# Patient Record
Sex: MALE | Race: WHITE | NOT HISPANIC OR LATINO | Employment: PART TIME | URBAN - METROPOLITAN AREA
[De-identification: names, ages, dates, MRNs, and addresses within clinical notes are randomized per-mention and may not be internally consistent; named-entity substitution may affect disease eponyms.]

---

## 2017-09-15 ENCOUNTER — APPOINTMENT (OUTPATIENT)
Dept: RADIOLOGY | Facility: MEDICAL CENTER | Age: 37
End: 2017-09-15
Attending: EMERGENCY MEDICINE
Payer: COMMERCIAL

## 2017-09-15 ENCOUNTER — HOSPITAL ENCOUNTER (EMERGENCY)
Facility: MEDICAL CENTER | Age: 37
End: 2017-09-15
Attending: EMERGENCY MEDICINE
Payer: COMMERCIAL

## 2017-09-15 VITALS
RESPIRATION RATE: 16 BRPM | HEART RATE: 60 BPM | TEMPERATURE: 98.6 F | SYSTOLIC BLOOD PRESSURE: 117 MMHG | HEIGHT: 73 IN | DIASTOLIC BLOOD PRESSURE: 85 MMHG | WEIGHT: 178.57 LBS | OXYGEN SATURATION: 97 % | BODY MASS INDEX: 23.67 KG/M2

## 2017-09-15 DIAGNOSIS — R07.2 PRECORDIAL PAIN: ICD-10-CM

## 2017-09-15 PROBLEM — R07.89 DISCOMFORT OF CHEST WALL: Status: ACTIVE | Noted: 2017-09-15

## 2017-09-15 PROBLEM — I35.1 NONRHEUMATIC AORTIC VALVE INSUFFICIENCY: Status: ACTIVE | Noted: 2017-09-15

## 2017-09-15 PROBLEM — I77.810 AORTIC ROOT DILATION (HCC): Status: ACTIVE | Noted: 2017-09-15

## 2017-09-15 LAB
ALBUMIN SERPL BCP-MCNC: 4.8 G/DL (ref 3.2–4.9)
ALBUMIN/GLOB SERPL: 1.8 G/DL
ALP SERPL-CCNC: 66 U/L (ref 30–99)
ALT SERPL-CCNC: 30 U/L (ref 2–50)
ANION GAP SERPL CALC-SCNC: 7 MMOL/L (ref 0–11.9)
AST SERPL-CCNC: 29 U/L (ref 12–45)
BASOPHILS # BLD AUTO: 0.7 % (ref 0–1.8)
BASOPHILS # BLD: 0.04 K/UL (ref 0–0.12)
BILIRUB SERPL-MCNC: 0.7 MG/DL (ref 0.1–1.5)
BUN SERPL-MCNC: 18 MG/DL (ref 8–22)
CALCIUM SERPL-MCNC: 9.6 MG/DL (ref 8.4–10.2)
CHLORIDE SERPL-SCNC: 104 MMOL/L (ref 96–112)
CO2 SERPL-SCNC: 27 MMOL/L (ref 20–33)
CREAT SERPL-MCNC: 0.87 MG/DL (ref 0.5–1.4)
DEPRECATED D DIMER PPP IA-ACNC: <200 NG/ML(D-DU)
EKG IMPRESSION: NORMAL
EOSINOPHIL # BLD AUTO: 0.08 K/UL (ref 0–0.51)
EOSINOPHIL NFR BLD: 1.4 % (ref 0–6.9)
ERYTHROCYTE [DISTWIDTH] IN BLOOD BY AUTOMATED COUNT: 42.7 FL (ref 35.9–50)
GFR SERPL CREATININE-BSD FRML MDRD: >60 ML/MIN/1.73 M 2
GLOBULIN SER CALC-MCNC: 2.7 G/DL (ref 1.9–3.5)
GLUCOSE SERPL-MCNC: 102 MG/DL (ref 65–99)
HCT VFR BLD AUTO: 44.4 % (ref 42–52)
HGB BLD-MCNC: 15.1 G/DL (ref 14–18)
IMM GRANULOCYTES # BLD AUTO: 0.02 K/UL (ref 0–0.11)
IMM GRANULOCYTES NFR BLD AUTO: 0.3 % (ref 0–0.9)
LV EJECT FRACT  99904: 55
LV EJECT FRACT MOD 2C 99903: 50.91
LV EJECT FRACT MOD 4C 99902: 62.34
LV EJECT FRACT MOD BP 99901: 56.9
LYMPHOCYTES # BLD AUTO: 2.31 K/UL (ref 1–4.8)
LYMPHOCYTES NFR BLD: 39.7 % (ref 22–41)
MCH RBC QN AUTO: 30.6 PG (ref 27–33)
MCHC RBC AUTO-ENTMCNC: 34 G/DL (ref 33.7–35.3)
MCV RBC AUTO: 89.9 FL (ref 81.4–97.8)
MONOCYTES # BLD AUTO: 0.6 K/UL (ref 0–0.85)
MONOCYTES NFR BLD AUTO: 10.3 % (ref 0–13.4)
NEUTROPHILS # BLD AUTO: 2.77 K/UL (ref 1.82–7.42)
NEUTROPHILS NFR BLD: 47.6 % (ref 44–72)
NRBC # BLD AUTO: 0 K/UL
NRBC BLD AUTO-RTO: 0 /100 WBC
PLATELET # BLD AUTO: 254 K/UL (ref 164–446)
PMV BLD AUTO: 9.6 FL (ref 9–12.9)
POTASSIUM SERPL-SCNC: 3.9 MMOL/L (ref 3.6–5.5)
PROT SERPL-MCNC: 7.5 G/DL (ref 6–8.2)
RBC # BLD AUTO: 4.94 M/UL (ref 4.7–6.1)
SODIUM SERPL-SCNC: 138 MMOL/L (ref 135–145)
TROPONIN I SERPL-MCNC: <0.02 NG/ML (ref 0–0.04)
TROPONIN I SERPL-MCNC: <0.02 NG/ML (ref 0–0.04)
WBC # BLD AUTO: 5.8 K/UL (ref 4.8–10.8)

## 2017-09-15 PROCEDURE — 93005 ELECTROCARDIOGRAM TRACING: CPT | Performed by: EMERGENCY MEDICINE

## 2017-09-15 PROCEDURE — 84484 ASSAY OF TROPONIN QUANT: CPT

## 2017-09-15 PROCEDURE — 99284 EMERGENCY DEPT VISIT MOD MDM: CPT

## 2017-09-15 PROCEDURE — 36415 COLL VENOUS BLD VENIPUNCTURE: CPT

## 2017-09-15 PROCEDURE — 93306 TTE W/DOPPLER COMPLETE: CPT | Mod: 26 | Performed by: INTERNAL MEDICINE

## 2017-09-15 PROCEDURE — 80053 COMPREHEN METABOLIC PANEL: CPT

## 2017-09-15 PROCEDURE — 700117 HCHG RX CONTRAST REV CODE 255: Performed by: EMERGENCY MEDICINE

## 2017-09-15 PROCEDURE — 85379 FIBRIN DEGRADATION QUANT: CPT

## 2017-09-15 PROCEDURE — 93017 CV STRESS TEST TRACING ONLY: CPT | Performed by: EMERGENCY MEDICINE

## 2017-09-15 PROCEDURE — 85025 COMPLETE CBC W/AUTO DIFF WBC: CPT

## 2017-09-15 PROCEDURE — 71275 CT ANGIOGRAPHY CHEST: CPT

## 2017-09-15 PROCEDURE — 71010 DX-CHEST-PORTABLE (1 VIEW): CPT

## 2017-09-15 PROCEDURE — 93306 TTE W/DOPPLER COMPLETE: CPT

## 2017-09-15 PROCEDURE — 93018 CV STRESS TEST I&R ONLY: CPT | Performed by: INTERNAL MEDICINE

## 2017-09-15 RX ADMIN — IOHEXOL 100 ML: 350 INJECTION, SOLUTION INTRAVENOUS at 11:55

## 2017-09-15 NOTE — ED NOTES
"Pt here alone with c/o    Chief Complaint   Patient presents with   • Chest Pain     intermittant chest pain for 1 month- lasts about 5 min; left sided radiates left shoulder.       /101   Pulse 85   Temp 36.8 °C (98.2 °F)   Resp 16   Ht 1.854 m (6' 1\")   Wt 81 kg (178 lb 9.2 oz)   SpO2 100%   BMI 23.56 kg/m²   "

## 2017-09-15 NOTE — ED NOTES
Pt given written and oral discharge instructions. Pt verbalized understanding of all instructions. Pt ambulating independently. No s/s of distress. VSS. Pt instructed to return if symptoms worsen.

## 2017-09-15 NOTE — DISCHARGE INSTRUCTIONS
Chest Pain, Nonspecific  It is often hard to give a specific diagnosis for the cause of chest pain. There is always a chance that your pain could be related to something serious, like a heart attack or a blood clot in the lungs. You need to follow up with your caregiver for further evaluation. More lab tests or other studies such as X-rays, electrocardiography, stress testing, or cardiac imaging may be needed to find the cause of your pain.  Most of the time, nonspecific chest pain improves within 2 to 3 days with rest and mild pain medicine. For the next few days, avoid physical exertion or activities that bring on pain. Do not smoke. Avoid drinking alcohol. Call your caregiver for routine follow-up as advised.   SEEK IMMEDIATE MEDICAL CARE IF:  · You develop increased chest pain or pain that radiates to the arm, neck, jaw, back, or abdomen.   · You develop shortness of breath, increased coughing, or you start coughing up blood.   · You have severe back or abdominal pain, nausea, or vomiting.   · You develop severe weakness, fainting, fever, or chills.   Document Released: 12/18/2006 Document Revised: 03/11/2013 Document Reviewed: 06/06/2008  [employer]® Patient Information ©2013 [employer].

## 2017-09-15 NOTE — CONSULTS
DATE OF SERVICE:  09/15/2017    HISTORY OF PRESENT ILLNESS:  The patient is a 37-year-old gentleman, being   seen at the request of Dr. Andrews Fontanez, because of a history of chest   discomfort.  This is never exertional and it is an intermittent positional chest   discomfort in the left lateral chest, sometimes infrascapular.  He has no   associated dyspnea and has extraordinarily outstanding exercise tolerance.  He   has not had palpitations, syncope, or near syncope.    He presented to the emergency department for evaluation, and his EKGs   demonstrated high QRS voltage compatible with his tall slender build.  An   echocardiogram was done, which demonstrated mild aortic regurgitation and mild   aortic root dilation.  Because of that, a CT scan of the chest was done,   which showed no evidence of dissection and no dilation above the root.  He   does have mild aortic regurgitation.  Stress test was done, which demonstrate   outstanding exercise tolerance and was negative for myocardial ischemia or   arrhythmia.    PAST MEDICAL HISTORY:  Remarkable only for shingles.  He has had no surgeries   and no chronic medical illnesses.    MEDICATIONS:  No regular medications.    ALLERGIES:  He has no known drug allergies.    SOCIAL HISTORY:  He does not smoke.    FAMILY HISTORY:  Remarkable for heart disease in a maternal great uncle, but   otherwise negative.  His parents are living and well.    REVIEW OF SYSTEMS:  Unremarkable.    PHYSICAL EXAMINATION:  CARDIOVASCULAR:  Exam is normal.  VITAL SIGNS:  Blood pressure 130/70, heart rate 60 and regular, respirations   16, and temperature 98 degrees orally.  HEENT:  Conjunctivae clear.  NECK:  JVP less than 5.  Normal carotid pulse amplitude and contour.  Thyroid   not palpable.  CHEST:  Clear to P/A and nontender.  HEART:  Normal first and second sound with no gallop or murmur.  ABDOMEN:  Soft and nontender without palpable organs, masses, or abnormal   pulses.  Bowel  sounds are normal and no bruits are heard.  EXTREMITIES:  Demonstrate intact pulses with femoral pulse preceding radial   pulse and no cyanosis, clubbing or edema.  NEUROLOGIC:  Shows normal mood, affect, orientation and no abnormalities.    LABORATORY DATA:  His blood pressure was elevated when he first came in, but   normalized promptly over the course of his hospital stay and was 120/70 at the   time that I saw him.    IMPRESSION:  1.  Chest discomfort of uncertain etiology with negative treadmill for   myocardial ischemia and negative CT scan for evidence of aortic disease except   for mild aortic root dilation.  2.  Mild hypertension, on admission.  3.  Mild aortic regurgitation.    PLAN:  He will be seen in cardiology followup.  Immediate reevaluation for any   change in quality or duration of the discomfort or any other associated   cardiovascular symptoms.  I saw him in the emergency department today, so that   I could arrange followup with him as an outpatient especially for blood pressure.    Thank you for the opportunity to see him and to participate in his care.       ____________________________________     MD AYESHA Heard / LANIE    DD:  09/15/2017 13:10:08  DT:  09/15/2017 13:51:34    D#:  [id number]  Job#:  [id number]    cc: LA NENA GONZALEZ MD

## 2017-09-15 NOTE — ED NOTES
Updated pt on POC. Pt in agreement. Pt verbalized understanding of remaining NPO until further notice.

## 2017-09-15 NOTE — PROGRESS NOTES
Regular TM test completed, able to achieve 100% of THR, maximum , tolerated well, no CP. Total exercise time 11 minutes, 12.8 mets.
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
## 2017-10-08 ENCOUNTER — HOSPITAL ENCOUNTER (EMERGENCY)
Facility: MEDICAL CENTER | Age: 37
End: 2017-10-08
Attending: EMERGENCY MEDICINE
Payer: COMMERCIAL

## 2017-10-08 VITALS
WEIGHT: 179.01 LBS | TEMPERATURE: 97.9 F | RESPIRATION RATE: 16 BRPM | BODY MASS INDEX: 23.62 KG/M2 | SYSTOLIC BLOOD PRESSURE: 120 MMHG | DIASTOLIC BLOOD PRESSURE: 83 MMHG | OXYGEN SATURATION: 99 % | HEART RATE: 52 BPM

## 2017-10-08 DIAGNOSIS — Z77.21 EXPOSURE TO BLOOD OR BODY FLUID: ICD-10-CM

## 2017-10-08 LAB — GFR SERPL CREATININE-BSD FRML MDRD: >60 ML/MIN/1.73 M 2

## 2017-10-08 PROCEDURE — 86706 HEP B SURFACE ANTIBODY: CPT

## 2017-10-08 PROCEDURE — 86803 HEPATITIS C AB TEST: CPT

## 2017-10-08 PROCEDURE — 87389 HIV-1 AG W/HIV-1&-2 AB AG IA: CPT

## 2017-10-08 PROCEDURE — 86704 HEP B CORE ANTIBODY TOTAL: CPT

## 2017-10-08 PROCEDURE — 36415 COLL VENOUS BLD VENIPUNCTURE: CPT

## 2017-10-08 PROCEDURE — 99283 EMERGENCY DEPT VISIT LOW MDM: CPT

## 2017-10-08 PROCEDURE — 80053 COMPREHEN METABOLIC PANEL: CPT

## 2017-10-08 PROCEDURE — 87340 HEPATITIS B SURFACE AG IA: CPT

## 2017-10-08 PROCEDURE — 85027 COMPLETE CBC AUTOMATED: CPT

## 2017-10-08 ASSESSMENT — ENCOUNTER SYMPTOMS
NAUSEA: 0
FEVER: 0

## 2017-10-08 ASSESSMENT — PAIN SCALES - GENERAL: PAINLEVEL_OUTOF10: 0

## 2017-10-09 LAB
ALBUMIN SERPL BCP-MCNC: 4.2 G/DL (ref 3.2–4.9)
ALBUMIN/GLOB SERPL: 1.6 G/DL
ALP SERPL-CCNC: 67 U/L (ref 30–99)
ALT SERPL-CCNC: 50 U/L (ref 2–50)
ANION GAP SERPL CALC-SCNC: 8 MMOL/L (ref 0–11.9)
AST SERPL-CCNC: 33 U/L (ref 12–45)
BILIRUB SERPL-MCNC: 0.4 MG/DL (ref 0.1–1.5)
BUN SERPL-MCNC: 15 MG/DL (ref 8–22)
CALCIUM SERPL-MCNC: 9.5 MG/DL (ref 8.5–10.5)
CHLORIDE SERPL-SCNC: 104 MMOL/L (ref 96–112)
CO2 SERPL-SCNC: 27 MMOL/L (ref 20–33)
CREAT SERPL-MCNC: 0.66 MG/DL (ref 0.5–1.4)
ERYTHROCYTE [DISTWIDTH] IN BLOOD BY AUTOMATED COUNT: 42 FL (ref 35.9–50)
GLOBULIN SER CALC-MCNC: 2.6 G/DL (ref 1.9–3.5)
GLUCOSE SERPL-MCNC: 98 MG/DL (ref 65–99)
HBV CORE AB SERPL QL IA: NEGATIVE
HBV SURFACE AB SERPL IA-ACNC: <3.1 MIU/ML (ref 0–10)
HBV SURFACE AG SER QL: NEGATIVE
HCT VFR BLD AUTO: 41 % (ref 42–52)
HCV AB SER QL: NEGATIVE
HGB BLD-MCNC: 13.6 G/DL (ref 14–18)
HIV 1+2 AB+HIV1 P24 AG SERPL QL IA: NON REACTIVE
MCH RBC QN AUTO: 30.2 PG (ref 27–33)
MCHC RBC AUTO-ENTMCNC: 33.2 G/DL (ref 33.7–35.3)
MCV RBC AUTO: 91.1 FL (ref 81.4–97.8)
PLATELET # BLD AUTO: 237 K/UL (ref 164–446)
PMV BLD AUTO: 9.6 FL (ref 9–12.9)
POTASSIUM SERPL-SCNC: 3.9 MMOL/L (ref 3.6–5.5)
PROT SERPL-MCNC: 6.8 G/DL (ref 6–8.2)
RBC # BLD AUTO: 4.5 M/UL (ref 4.7–6.1)
SODIUM SERPL-SCNC: 139 MMOL/L (ref 135–145)
WBC # BLD AUTO: 7.6 K/UL (ref 4.8–10.8)
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
## 2017-10-09 NOTE — DISCHARGE INSTRUCTIONS
You will need repeat HIV testing in 6 weeks, 6 months and a year.   Wash wounds with soap and water. Return if you have redness, increasing pain or pus from your wounds.   Body Fluid Exposure  Body fluid exposure happens most often with blood and sexual contact. It also happens by sharing needles. Most of the time this type of exposure does not cause any problems. Several infections can be passed by body fluid exposure. The biggest risk is for getting hepatitis B or hepatitis C, or HIV infection (the virus that causes AIDS).  Hepatitis B and hepatitis C cause a serious liver infection. This can cause death. Immunization against hepatitis B can prevent this infection. Your caregiver may want you to get these shots. All health care workers or those exposed to human body fluids regularly should be immunized against hepatitis B. This requires a first dose with booster doses at 1 and 6 months. There is no hepatitis C vaccine. There is no vaccine against AIDS.  The risk of transmitting HIV infection by a single body fluid exposure is very small. Blood exposure to mucous membranes has on average caused 1 HIV infection for every 1,000 exposures if the source is known to carry HIV. About 1 in 300 needle stick recipients from a known HIV positive person get infected. Infection with HIV is very serious. High risk exposures should consider post-exposure preventive treatment. Treatment reduces the chance of getting an HIV infection.  A combination of anti-HIV drugs is recommended for risky exposures. Preventive treatment should be started as soon as possible. It is usually continued for 4 weeks. Blood tests for HIV should be taken immediately, and repeated at 3 to 6 weeks and again at 3 and 6 months.   Arrange follow-up with your caregiver.  Document Released: 12/18/2006 Document Revised: 03/11/2013 Document Reviewed: 06/06/2008  [employer]® Patient Information ©2014 [employer].
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
## 2021-11-07 ENCOUNTER — TELEPHONE (OUTPATIENT)
Dept: SCHEDULING | Facility: IMAGING CENTER | Age: 41
End: 2021-11-07

## 2022-02-07 ENCOUNTER — OFFICE VISIT (OUTPATIENT)
Dept: MEDICAL GROUP | Facility: MEDICAL CENTER | Age: 42
End: 2022-02-07
Payer: COMMERCIAL

## 2022-02-07 VITALS
OXYGEN SATURATION: 98 % | WEIGHT: 190.4 LBS | RESPIRATION RATE: 18 BRPM | TEMPERATURE: 98.3 F | BODY MASS INDEX: 25.23 KG/M2 | HEIGHT: 73 IN | HEART RATE: 68 BPM | SYSTOLIC BLOOD PRESSURE: 112 MMHG | DIASTOLIC BLOOD PRESSURE: 68 MMHG

## 2022-02-07 DIAGNOSIS — Z00.00 HEALTHCARE MAINTENANCE: ICD-10-CM

## 2022-02-07 DIAGNOSIS — I77.810 AORTIC ROOT DILATION (HCC): ICD-10-CM

## 2022-02-07 DIAGNOSIS — Z23 NEED FOR VACCINATION: ICD-10-CM

## 2022-02-07 DIAGNOSIS — R09.89 DECREASED PULSE: ICD-10-CM

## 2022-02-07 DIAGNOSIS — F40.10 SOCIAL ANXIETY DISORDER: ICD-10-CM

## 2022-02-07 DIAGNOSIS — G62.9 NEUROPATHY: ICD-10-CM

## 2022-02-07 PROBLEM — I35.1 NONRHEUMATIC AORTIC VALVE INSUFFICIENCY: Status: RESOLVED | Noted: 2017-09-15 | Resolved: 2022-02-07

## 2022-02-07 PROBLEM — R07.89 DISCOMFORT OF CHEST WALL: Status: RESOLVED | Noted: 2017-09-15 | Resolved: 2022-02-07

## 2022-02-07 PROCEDURE — 90715 TDAP VACCINE 7 YRS/> IM: CPT | Performed by: FAMILY MEDICINE

## 2022-02-07 PROCEDURE — 99204 OFFICE O/P NEW MOD 45 MIN: CPT | Mod: 25 | Performed by: FAMILY MEDICINE

## 2022-02-07 PROCEDURE — 90471 IMMUNIZATION ADMIN: CPT | Performed by: FAMILY MEDICINE

## 2022-02-07 RX ORDER — IBUPROFEN 200 MG
200 TABLET ORAL EVERY 6 HOURS PRN
COMMUNITY
End: 2023-05-05

## 2022-02-07 RX ORDER — KETOCONAZOLE 20 MG/G
CREAM TOPICAL
Qty: 30 G | Refills: 0 | Status: SHIPPED | OUTPATIENT
Start: 2022-02-07 | End: 2023-05-05

## 2022-02-07 RX ORDER — SERTRALINE HYDROCHLORIDE 25 MG/1
25 TABLET, FILM COATED ORAL DAILY
Qty: 90 TABLET | Refills: 1 | Status: SHIPPED | OUTPATIENT
Start: 2022-02-07 | End: 2022-02-28

## 2022-02-07 ASSESSMENT — PATIENT HEALTH QUESTIONNAIRE - PHQ9: CLINICAL INTERPRETATION OF PHQ2 SCORE: 0

## 2022-02-07 NOTE — ASSESSMENT & PLAN NOTE
Patient reports generalized social anxiety.  He tends to get clammy hands and sweats when he goes to shake someone's hand.  This is fairly frequent.

## 2022-02-07 NOTE — ASSESSMENT & PLAN NOTE
Patient reports a few months of atraumatic, intermittent redness and sensitivity to the skin on the bilateral feet, worse on the right.  This tends to be localized to the lateral foot and plantar third and fourth toes.  He has tried over-the-counter antifungals and this doesn't seem to help.

## 2022-02-07 NOTE — PROGRESS NOTES
"[facility]  Progress Note  New Patient    Subjective:   Michelle Magallon is a 41 y.o. male here today with a chief complaint of foot pain. This is a new patient to me. The patient comes in alone.     Neuropathy  Patient reports a few months of atraumatic, intermittent redness and sensitivity to the skin on the bilateral feet, worse on the right.  This tends to be localized to the lateral foot and plantar third and fourth toes.  He has tried over-the-counter antifungals and this doesn't seem to help.    Aortic root dilation (CMS-HCC)  2017 imaging demonstrates 3.9 cm ascending aorta.    Decreased pulse  Left DP.    Social anxiety disorder  Patient reports generalized social anxiety.  He tends to get clammy hands and sweats when he goes to shake someone's hand.  This is fairly frequent.      Current Outpatient Medications on File Prior to Visit   Medication Sig Dispense Refill   • ibuprofen (MOTRIN) 200 MG Tab Take 200 mg by mouth every 6 hours as needed.       No current facility-administered medications on file prior to visit.       Past Medical History:   Diagnosis Date   • Shingles        Allergies: Patient has no known allergies.    Surgical History:  has a past surgical history that includes vasectomy.    Family History: family history includes Heart Disease in his maternal uncle; No Known Problems in his brother, father, mother, and sister.    Social History:  reports that he has never smoked. He has never used smokeless tobacco. He reports current alcohol use. He reports that he does not use drugs.    ROS:   No fever.        Objective:     Vitals:    02/07/22 0936   BP: 112/68   BP Location: Left arm   Patient Position: Sitting   BP Cuff Size: Adult long   Pulse: 68   Resp: 18   Temp: 36.8 °C (98.3 °F)   TempSrc: Temporal   SpO2: 98%   Weight: 86.4 kg (190 lb 6.4 oz)   Height: 1.854 m (6' 1\")       Physical Exam:  Constitutional: Alert, no distress.  Skin: Warm, dry, good turgor, no rashes in visible " areas.  Respiratory: Unlabored respiratory effort, lungs clear to auscultation, no wheezes, no ronchi.  Cardiovascular: Normal S1, S2, no murmur, no edema.  Neuromuscular: Good capillary refill in the bilateral toes.  1+ DP pulse on the right, difficult to palpate on the left.  Negative Tinel's sign to the bilateral ankles with no tenderness over the bilateral area of Krueger's neuroma or medial calcaneal tuberosity.  No tenderness or pain over the bilateral foot bilateral malleoli, navicular bones or fifth metatarsals.        Assessment and Plan:     1. Need for vaccination  - TDAP VACCINE =>6YO IM    2. Neuropathy  This sounds consistent with a neuropathy.  Less likely would be tinea pedis, Krueger's neuroma or tarsal tunnel syndrome.  We will proceed with the following labs and obtain records.  Will empirically treat with ketoconazole, though the likelihood of tinea pedis, as mentioned above, is low. We will obtain an GARIMA considering difficulty palpating the left DP pulse. We will follow up after the studies are done.  Depending on how things progress, an EMG or perhaps some imagign may be of value here.  - CBC WITH DIFFERENTIAL; Future  - Comp Metabolic Panel; Future  - TSH WITH REFLEX TO FT4; Future  - VITAMIN B12; Future  - FOLATE; Future  - HEP C VIRUS ANTIBODY; Future  - HEP B SURFACE ANTIGEN; Future  - US-EXTREMITY ARTERY LOWER BILAT W/GARIMA (COMBO); Future  - HEMOGLOBIN A1C; Future  - ketoconazole (NIZORAL) 2 % Cream; Apply once daily to feet x 2 weeks.  Dispense: 30 g; Refill: 0    3. Decreased pulse  - US-EXTREMITY ARTERY LOWER BILAT W/GARIMA (COMBO); Future    4. Healthcare maintenance  - Comp Metabolic Panel; Future  - Lipid Profile; Future    5. Social anxiety disorder  Considering the frequency, will treat with Zoloft.  Also discussed CBT as an option.  - sertraline (ZOLOFT) 25 MG tablet; Take 1 Tablet by mouth every day.  Dispense: 90 Tablet; Refill: 1    6. Aortic root dilation (HCC)  -Acquire records,  may need repeat echocardiogram.        Followup: Return in about 3 weeks (around 2/28/2022), or if symptoms worsen or fail to improve.

## 2022-02-08 ENCOUNTER — HOSPITAL ENCOUNTER (OUTPATIENT)
Dept: LAB | Facility: MEDICAL CENTER | Age: 42
End: 2022-02-08
Attending: FAMILY MEDICINE
Payer: COMMERCIAL

## 2022-02-08 DIAGNOSIS — G62.9 NEUROPATHY: ICD-10-CM

## 2022-02-08 DIAGNOSIS — Z00.00 HEALTHCARE MAINTENANCE: ICD-10-CM

## 2022-02-08 LAB
ALBUMIN SERPL BCP-MCNC: 4.7 G/DL (ref 3.2–4.9)
ALBUMIN/GLOB SERPL: 2 G/DL
ALP SERPL-CCNC: 65 U/L (ref 30–99)
ALT SERPL-CCNC: 24 U/L (ref 2–50)
ANION GAP SERPL CALC-SCNC: 11 MMOL/L (ref 7–16)
AST SERPL-CCNC: 20 U/L (ref 12–45)
BASOPHILS # BLD AUTO: 0.6 % (ref 0–1.8)
BASOPHILS # BLD: 0.04 K/UL (ref 0–0.12)
BILIRUB SERPL-MCNC: 0.6 MG/DL (ref 0.1–1.5)
BUN SERPL-MCNC: 12 MG/DL (ref 8–22)
CALCIUM SERPL-MCNC: 9.4 MG/DL (ref 8.5–10.5)
CHLORIDE SERPL-SCNC: 101 MMOL/L (ref 96–112)
CHOLEST SERPL-MCNC: 223 MG/DL (ref 100–199)
CO2 SERPL-SCNC: 24 MMOL/L (ref 20–33)
CREAT SERPL-MCNC: 0.6 MG/DL (ref 0.5–1.4)
EOSINOPHIL # BLD AUTO: 0.1 K/UL (ref 0–0.51)
EOSINOPHIL NFR BLD: 1.5 % (ref 0–6.9)
ERYTHROCYTE [DISTWIDTH] IN BLOOD BY AUTOMATED COUNT: 43.4 FL (ref 35.9–50)
EST. AVERAGE GLUCOSE BLD GHB EST-MCNC: 123 MG/DL
FASTING STATUS PATIENT QL REPORTED: NORMAL
GLOBULIN SER CALC-MCNC: 2.4 G/DL (ref 1.9–3.5)
GLUCOSE SERPL-MCNC: 76 MG/DL (ref 65–99)
HBA1C MFR BLD: 5.9 % (ref 4–5.6)
HCT VFR BLD AUTO: 43.4 % (ref 42–52)
HDLC SERPL-MCNC: 90 MG/DL
HGB BLD-MCNC: 14.2 G/DL (ref 14–18)
IMM GRANULOCYTES # BLD AUTO: 0.03 K/UL (ref 0–0.11)
IMM GRANULOCYTES NFR BLD AUTO: 0.4 % (ref 0–0.9)
LDLC SERPL CALC-MCNC: 121 MG/DL
LYMPHOCYTES # BLD AUTO: 1.58 K/UL (ref 1–4.8)
LYMPHOCYTES NFR BLD: 23.6 % (ref 22–41)
MCH RBC QN AUTO: 30 PG (ref 27–33)
MCHC RBC AUTO-ENTMCNC: 32.7 G/DL (ref 33.7–35.3)
MCV RBC AUTO: 91.8 FL (ref 81.4–97.8)
MONOCYTES # BLD AUTO: 0.52 K/UL (ref 0–0.85)
MONOCYTES NFR BLD AUTO: 7.8 % (ref 0–13.4)
NEUTROPHILS # BLD AUTO: 4.42 K/UL (ref 1.82–7.42)
NEUTROPHILS NFR BLD: 66.1 % (ref 44–72)
NRBC # BLD AUTO: 0 K/UL
NRBC BLD-RTO: 0 /100 WBC
PLATELET # BLD AUTO: 264 K/UL (ref 164–446)
PMV BLD AUTO: 9.9 FL (ref 9–12.9)
POTASSIUM SERPL-SCNC: 4.2 MMOL/L (ref 3.6–5.5)
PROT SERPL-MCNC: 7.1 G/DL (ref 6–8.2)
RBC # BLD AUTO: 4.73 M/UL (ref 4.7–6.1)
SODIUM SERPL-SCNC: 136 MMOL/L (ref 135–145)
TRIGL SERPL-MCNC: 62 MG/DL (ref 0–149)
WBC # BLD AUTO: 6.7 K/UL (ref 4.8–10.8)

## 2022-02-08 PROCEDURE — 86803 HEPATITIS C AB TEST: CPT

## 2022-02-08 PROCEDURE — 36415 COLL VENOUS BLD VENIPUNCTURE: CPT

## 2022-02-08 PROCEDURE — 84443 ASSAY THYROID STIM HORMONE: CPT

## 2022-02-08 PROCEDURE — 80053 COMPREHEN METABOLIC PANEL: CPT

## 2022-02-08 PROCEDURE — 87340 HEPATITIS B SURFACE AG IA: CPT

## 2022-02-08 PROCEDURE — 83036 HEMOGLOBIN GLYCOSYLATED A1C: CPT

## 2022-02-08 PROCEDURE — 85025 COMPLETE CBC W/AUTO DIFF WBC: CPT

## 2022-02-08 PROCEDURE — 80061 LIPID PANEL: CPT

## 2022-02-08 PROCEDURE — 82746 ASSAY OF FOLIC ACID SERUM: CPT

## 2022-02-08 PROCEDURE — 82607 VITAMIN B-12: CPT

## 2022-02-09 LAB
FOLATE SERPL-MCNC: 14.7 NG/ML
HBV SURFACE AG SER QL: NORMAL
HCV AB SER QL: NORMAL
TSH SERPL DL<=0.005 MIU/L-ACNC: 1.1 UIU/ML (ref 0.38–5.33)
VIT B12 SERPL-MCNC: 529 PG/ML (ref 211–911)

## 2022-02-28 ENCOUNTER — TELEMEDICINE (OUTPATIENT)
Dept: MEDICAL GROUP | Facility: MEDICAL CENTER | Age: 42
End: 2022-02-28
Payer: COMMERCIAL

## 2022-02-28 VITALS — WEIGHT: 185 LBS | BODY MASS INDEX: 24.52 KG/M2 | HEART RATE: 68 BPM | HEIGHT: 73 IN

## 2022-02-28 DIAGNOSIS — T69.1XXA CHILBLAINS, INITIAL ENCOUNTER: ICD-10-CM

## 2022-02-28 DIAGNOSIS — I77.810 AORTIC ROOT DILATION (HCC): ICD-10-CM

## 2022-02-28 DIAGNOSIS — E78.5 DYSLIPIDEMIA: ICD-10-CM

## 2022-02-28 DIAGNOSIS — F40.10 SOCIAL ANXIETY DISORDER: ICD-10-CM

## 2022-02-28 DIAGNOSIS — R09.89 DECREASED PULSE: ICD-10-CM

## 2022-02-28 PROCEDURE — 99214 OFFICE O/P EST MOD 30 MIN: CPT | Mod: 95 | Performed by: FAMILY MEDICINE

## 2022-02-28 ASSESSMENT — FIBROSIS 4 INDEX: FIB4 SCORE: 0.68

## 2022-02-28 NOTE — PROGRESS NOTES
Virtual Visit: Established Patient   This visit was conducted via Zoom using secure and encrypted videoconferencing technology.   The patient was in a private location outside of their home in the state of [place].    The patient's identity was confirmed and verbal consent was obtained for this virtual visit.    Subjective:   CC:   Chief Complaint   Patient presents with   • Follow-Up     Michelle Magallon is a 42 y.o. male presenting for evaluation and management of:    Dyslipidemia  The 10-year ASCVD risk score (Genecurtis HERNANDEZ Jr., et al., 2013) is: 0.8%    Aortic root dilation (CMS-HCC)  2017 imaging demonstrates 3.9 cm ascending aorta.    Chilblains  At the last visit the patient described a few months of atraumatic, intermittent redness and sensitivity to the skin on the bilateral feet, worse on the right.  This tends to be localized to the lateral foot and plantar third and fourth toes.  He has tried over-the-counter antifungals and this doesn't seem to help. I suspected neuropathy and performed a preliminary neuro lab w/u which was, overall, reassuring. The patient supplies me pictures today. He has been wearing looser fitting shoes and this seems to help.     Decreased pulse  Left DP at last visit, didn't get labs.     Social anxiety disorder  Didn't start zoloft, would like to treat supportively for now.         Current medicines (including changes today)  Current Outpatient Medications   Medication Sig Dispense Refill   • ibuprofen (MOTRIN) 200 MG Tab Take 200 mg by mouth every 6 hours as needed.     • ketoconazole (NIZORAL) 2 % Cream Apply once daily to feet x 2 weeks. 30 g 0     No current facility-administered medications for this visit.       Patient Active Problem List    Diagnosis Date Noted   • Dyslipidemia 02/28/2022   • Chilblains 02/07/2022   • Decreased pulse 02/07/2022   • Healthcare maintenance 02/07/2022   • Social anxiety disorder 02/07/2022   • Aortic root dilation (HCC) 09/15/2017         "Objective:   Pulse 68 Comment: Pt reported  Ht 1.854 m (6' 1\") Comment: Pt reported  Wt 83.9 kg (185 lb) Comment: Pt reported  BMI 24.41 kg/m²     Physical Exam:  Constitutional: Alert, no distress, well-groomed.  Skin: provides pictures today of foot, see media.     Assessment and Plan:   The following treatment plan was discussed:     1. Dyslipidemia  - continue diet/exercise.     2. Aortic root dilation (HCC)  - EC-ECHOCARDIOGRAM COMPLETE W/O CONT; Future    3. Chilblains, initial encounter  Less likely neuropathy, more likely chilblains at this point. Encouraged keeping feet warm and offloading. Will reassess in 2 mo, may consider SPEP and KEE if remains c/w chilblains.     4. Decreased pulse  - reminded to get GARIMA.     5. Social anxiety disorder  - monitor.     Follow-up: Return in about 2 months (around 4/28/2022), or if symptoms worsen or fail to improve.         "

## 2022-02-28 NOTE — ASSESSMENT & PLAN NOTE
At the last visit the patient described a few months of atraumatic, intermittent redness and sensitivity to the skin on the bilateral feet, worse on the right.  This tends to be localized to the lateral foot and plantar third and fourth toes.  He has tried over-the-counter antifungals and this doesn't seem to help. I suspected neuropathy and performed a preliminary neuro lab w/u which was, overall, reassuring. The patient supplies me pictures today. He has been wearing looser fitting shoes and this seems to help.

## 2022-03-28 ENCOUNTER — APPOINTMENT (OUTPATIENT)
Dept: RADIOLOGY | Facility: MEDICAL CENTER | Age: 42
End: 2022-03-28
Attending: FAMILY MEDICINE
Payer: COMMERCIAL

## 2023-05-04 NOTE — PROGRESS NOTES
"Subjective:     CC:  Diagnoses of Dyslipidemia, Prediabetes, Aortic root dilation (HCC), Social anxiety disorder, Preventative health care, Colon cancer screening, and Encounter to establish care with new doctor were pertinent to this visit.    HISTORY OF THE PRESENT ILLNESS: Patient is a 43 y.o. male. This pleasant patient is here today to establish care and discuss chronic conditions. His prior PCP was Dr. Naranjo.    Problem   Prediabetes    Chronic condition. Stable with healthy lifestyle management.     Dyslipidemia    Chronic condition per chart history.     Preventative Health Care    Patient is here to establish care today. Feels well overall.     Social Anxiety Disorder    Chronic condition. He gets sweaty hands especially worse with social situations.     Aortic root dilation (HCC)    Chronic condition. CT scan in 2017 showed mild dilatation of the ascending aorta with a maximum diameter of 3.9 cm.         No current [facility]-ordered outpatient medications on file.     No current [facility]-ordered facility-administered medications on file.     Social history  Living situation: lives with family at home  Occupation: works as dentist  Marital status:   Alcohol/tobacco/illicit drugs: 1-2 drinks per week, denies tobacco or illicit drug use  Diet/Exercise: Eats healthy diet in general. Regular exercise with running.    Health Maintenance: reviewed  Vaccines: Tdap 02/2022, Pfizer COVID #3 received 09/2022  Colonoscopy never done, would like referral    ROS:   Gen: no fevers/chills, no changes in weight  Pulm: no sob, no cough  CV: no chest pain, no palpitations  GI: no nausea/vomiting, no diarrhea  : no dysuria  MSk: no myalgias  Skin: no rash  Neuro: no headaches, no numbness/tingling      Objective:     Exam: /64 (BP Location: Left arm, Patient Position: Sitting, BP Cuff Size: Adult)   Pulse 61   Temp 36.4 °C (97.6 °F) (Temporal)   Ht 1.854 m (6' 1\")   Wt 83.5 kg (184 lb)   SpO2 99%  Body mass index " is 24.28 kg/m².    General: Normal appearing. No distress.  HEENT: Normocephalic.   Neck: Supple without JVD or bruit. Thyroid is not enlarged.  Pulmonary: Clear to ausculation. Normal effort. No rales, ronchi, or wheezing.  Cardiovascular: Regular rate and rhythm without murmur.  Abdomen: Soft, nontender, nondistended. Normal bowel sounds.  Neurologic: Grossly nonfocal  Skin: Warm and dry. No obvious lesions.  Musculoskeletal: Normal gait. No extremity cyanosis, clubbing, or edema.  Psych: Normal mood and affect. Alert and oriented x3. Judgment and insight is normal.    Labs:   Lab Results   Component Value Date/Time    WBC 6.7 02/08/2022 01:06 PM    RBC 4.73 02/08/2022 01:06 PM    HEMOGLOBIN 14.2 02/08/2022 01:06 PM    HEMATOCRIT 43.4 02/08/2022 01:06 PM    MCV 91.8 02/08/2022 01:06 PM    MCH 30.0 02/08/2022 01:06 PM    MCHC 32.7 (L) 02/08/2022 01:06 PM    RDW 43.4 02/08/2022 01:06 PM    PLATELETCT 264 02/08/2022 01:06 PM    MPV 9.9 02/08/2022 01:06 PM      Lab Results   Component Value Date/Time    SODIUM 136 02/08/2022 01:06 PM    POTASSIUM 4.2 02/08/2022 01:06 PM    CHLORIDE 101 02/08/2022 01:06 PM    CO2 24 02/08/2022 01:06 PM    ANION 11.0 02/08/2022 01:06 PM    GLUCOSE 76 02/08/2022 01:06 PM    BUN 12 02/08/2022 01:06 PM    CREATININE 0.60 02/08/2022 01:06 PM    CALCIUM 9.4 02/08/2022 01:06 PM    ASTSGOT 20 02/08/2022 01:06 PM    ALTSGPT 24 02/08/2022 01:06 PM    TBILIRUBIN 0.6 02/08/2022 01:06 PM    ALBUMIN 4.7 02/08/2022 01:06 PM    TOTPROTEIN 7.1 02/08/2022 01:06 PM    GLOBULIN 2.4 02/08/2022 01:06 PM    AGRATIO 2.0 02/08/2022 01:06 PM     Lab Results   Component Value Date/Time    HBA1C 5.9 (H) 02/08/2022 01:06 PM      Lab Results   Component Value Date/Time    CHOLSTRLTOT 223 (H) 02/08/2022 1306    TRIGLYCERIDE 62 02/08/2022 1306    HDL 90 02/08/2022 1306     (H) 02/08/2022 1306     Lab Results   Component Value Date/Time    TSHULTRASEN 1.100 02/08/2022 1306     CT thoracic aorta  09/2017  IMPRESSION:  1.  Mild dilatation of the ascending aorta with a maximum diameter of 3.9 cm. No dissection is identified.    Assessment & Plan:   43 y.o. male with the following -    1. Dyslipidemia  Chronic condition. Stable with healthy lifestyle management. Plan to reassess.  - Lipid Profile; Future  - TSH WITH REFLEX TO FT4; Future    2. Prediabetes  Chronic condition. Stable with healthy lifestyle management. Plan to reassess.  - HEMOGLOBIN A1C; Future    3. Aortic root dilation (HCC)  Chronic condition, stable. Has been a while since this has been rechecked. Plan to reassess with CT per order.  - CT-THORACIC AORTA EVALUATION; Future    4. Social anxiety disorder  Chronic condition, stable. Previously prescribed sertraline but never took the medication. Patient does not feel the need for behavioral or medication therapy at this time.  - TSH WITH REFLEX TO FT4; Future    5. Preventative health care  - Referral to GI for Colonoscopy  - Comp Metabolic Panel; Future  - HEMOGLOBIN A1C; Future  - Lipid Profile; Future  - TSH WITH REFLEX TO FT4; Future  - HEP B SURFACE AB; Future    6. Colon cancer screening  - Referral to GI for Colonoscopy    7. Encounter to establish care with new doctor    Patient is aware that I will be leaving [facility] at the end of September and that he will need to establish with a new primary care provider. It has been a real privilege serving as his family doctor.    Return in about 1 year (around 5/5/2024) for annual physical.    Please note that this dictation was created using voice recognition software. I have made every reasonable attempt to correct obvious errors, but I expect that there are errors of grammar and possibly content that I did not discover before finalizing the note.

## 2023-05-05 ENCOUNTER — OFFICE VISIT (OUTPATIENT)
Dept: MEDICAL GROUP | Facility: MEDICAL CENTER | Age: 43
End: 2023-05-05
Payer: COMMERCIAL

## 2023-05-05 VITALS
BODY MASS INDEX: 24.39 KG/M2 | SYSTOLIC BLOOD PRESSURE: 124 MMHG | WEIGHT: 184 LBS | TEMPERATURE: 97.6 F | OXYGEN SATURATION: 99 % | DIASTOLIC BLOOD PRESSURE: 64 MMHG | HEIGHT: 73 IN | HEART RATE: 61 BPM

## 2023-05-05 DIAGNOSIS — I77.810 AORTIC ROOT DILATION (HCC): ICD-10-CM

## 2023-05-05 DIAGNOSIS — R73.03 PREDIABETES: ICD-10-CM

## 2023-05-05 DIAGNOSIS — F40.10 SOCIAL ANXIETY DISORDER: ICD-10-CM

## 2023-05-05 DIAGNOSIS — Z12.11 COLON CANCER SCREENING: ICD-10-CM

## 2023-05-05 DIAGNOSIS — Z00.00 PREVENTATIVE HEALTH CARE: ICD-10-CM

## 2023-05-05 DIAGNOSIS — Z76.89 ENCOUNTER TO ESTABLISH CARE WITH NEW DOCTOR: ICD-10-CM

## 2023-05-05 DIAGNOSIS — E78.5 DYSLIPIDEMIA: ICD-10-CM

## 2023-05-05 PROCEDURE — 99214 OFFICE O/P EST MOD 30 MIN: CPT | Performed by: STUDENT IN AN ORGANIZED HEALTH CARE EDUCATION/TRAINING PROGRAM

## 2023-05-05 ASSESSMENT — FIBROSIS 4 INDEX: FIB4 SCORE: 0.7

## 2023-05-05 ASSESSMENT — PATIENT HEALTH QUESTIONNAIRE - PHQ9: CLINICAL INTERPRETATION OF PHQ2 SCORE: 0

## 2023-05-24 ENCOUNTER — HOSPITAL ENCOUNTER (OUTPATIENT)
Dept: LAB | Facility: MEDICAL CENTER | Age: 43
End: 2023-05-24
Attending: STUDENT IN AN ORGANIZED HEALTH CARE EDUCATION/TRAINING PROGRAM
Payer: COMMERCIAL

## 2023-05-24 DIAGNOSIS — R73.03 PREDIABETES: ICD-10-CM

## 2023-05-24 DIAGNOSIS — F40.10 SOCIAL ANXIETY DISORDER: ICD-10-CM

## 2023-05-24 DIAGNOSIS — E78.5 DYSLIPIDEMIA: ICD-10-CM

## 2023-05-24 DIAGNOSIS — Z00.00 PREVENTATIVE HEALTH CARE: ICD-10-CM

## 2023-05-24 LAB
ALBUMIN SERPL BCP-MCNC: 4.5 G/DL (ref 3.2–4.9)
ALBUMIN/GLOB SERPL: 1.8 G/DL
ALP SERPL-CCNC: 69 U/L (ref 30–99)
ALT SERPL-CCNC: 28 U/L (ref 2–50)
ANION GAP SERPL CALC-SCNC: 12 MMOL/L (ref 7–16)
AST SERPL-CCNC: 25 U/L (ref 12–45)
BILIRUB SERPL-MCNC: 0.6 MG/DL (ref 0.1–1.5)
BUN SERPL-MCNC: 15 MG/DL (ref 8–22)
CALCIUM ALBUM COR SERPL-MCNC: 8.8 MG/DL (ref 8.5–10.5)
CALCIUM SERPL-MCNC: 9.2 MG/DL (ref 8.5–10.5)
CHLORIDE SERPL-SCNC: 105 MMOL/L (ref 96–112)
CHOLEST SERPL-MCNC: 239 MG/DL (ref 100–199)
CO2 SERPL-SCNC: 24 MMOL/L (ref 20–33)
CREAT SERPL-MCNC: 0.62 MG/DL (ref 0.5–1.4)
EST. AVERAGE GLUCOSE BLD GHB EST-MCNC: 108 MG/DL
GFR SERPLBLD CREATININE-BSD FMLA CKD-EPI: 120 ML/MIN/1.73 M 2
GLOBULIN SER CALC-MCNC: 2.5 G/DL (ref 1.9–3.5)
GLUCOSE SERPL-MCNC: 73 MG/DL (ref 65–99)
HBA1C MFR BLD: 5.4 % (ref 4–5.6)
HBV SURFACE AB SERPL IA-ACNC: <3.5 MIU/ML (ref 0–10)
HDLC SERPL-MCNC: 105 MG/DL
LDLC SERPL CALC-MCNC: 128 MG/DL
POTASSIUM SERPL-SCNC: 5 MMOL/L (ref 3.6–5.5)
PROT SERPL-MCNC: 7 G/DL (ref 6–8.2)
SODIUM SERPL-SCNC: 141 MMOL/L (ref 135–145)
TRIGL SERPL-MCNC: 32 MG/DL (ref 0–149)
TSH SERPL DL<=0.005 MIU/L-ACNC: 1 UIU/ML (ref 0.38–5.33)

## 2023-05-24 PROCEDURE — 80053 COMPREHEN METABOLIC PANEL: CPT

## 2023-05-24 PROCEDURE — 80061 LIPID PANEL: CPT

## 2023-05-24 PROCEDURE — 86706 HEP B SURFACE ANTIBODY: CPT

## 2023-05-24 PROCEDURE — 84443 ASSAY THYROID STIM HORMONE: CPT

## 2023-05-24 PROCEDURE — 36415 COLL VENOUS BLD VENIPUNCTURE: CPT

## 2023-05-24 PROCEDURE — 83036 HEMOGLOBIN GLYCOSYLATED A1C: CPT

## 2023-09-11 ENCOUNTER — DOCUMENTATION (OUTPATIENT)
Dept: HEALTH INFORMATION MANAGEMENT | Facility: OTHER | Age: 43
End: 2023-09-11
Payer: COMMERCIAL

## 2023-11-08 ENCOUNTER — OFFICE VISIT (OUTPATIENT)
Dept: MEDICAL GROUP | Facility: MEDICAL CENTER | Age: 43
End: 2023-11-08
Payer: COMMERCIAL

## 2023-11-08 VITALS
HEART RATE: 82 BPM | SYSTOLIC BLOOD PRESSURE: 122 MMHG | TEMPERATURE: 98.4 F | WEIGHT: 186.2 LBS | RESPIRATION RATE: 16 BRPM | HEIGHT: 73 IN | OXYGEN SATURATION: 99 % | DIASTOLIC BLOOD PRESSURE: 74 MMHG | BODY MASS INDEX: 24.68 KG/M2

## 2023-11-08 DIAGNOSIS — Z23 NEED FOR VACCINATION: ICD-10-CM

## 2023-11-08 DIAGNOSIS — I77.810 AORTIC ROOT DILATION (HCC): ICD-10-CM

## 2023-11-08 DIAGNOSIS — E78.5 DYSLIPIDEMIA: ICD-10-CM

## 2023-11-08 DIAGNOSIS — R06.83 SNORING: ICD-10-CM

## 2023-11-08 DIAGNOSIS — Z00.00 PE (PHYSICAL EXAM), ANNUAL: ICD-10-CM

## 2023-11-08 PROCEDURE — 3074F SYST BP LT 130 MM HG: CPT | Performed by: NURSE PRACTITIONER

## 2023-11-08 PROCEDURE — 99214 OFFICE O/P EST MOD 30 MIN: CPT | Mod: 25 | Performed by: NURSE PRACTITIONER

## 2023-11-08 PROCEDURE — 90746 HEPB VACCINE 3 DOSE ADULT IM: CPT | Performed by: NURSE PRACTITIONER

## 2023-11-08 PROCEDURE — 3078F DIAST BP <80 MM HG: CPT | Performed by: NURSE PRACTITIONER

## 2023-11-08 PROCEDURE — 90471 IMMUNIZATION ADMIN: CPT | Performed by: NURSE PRACTITIONER

## 2023-11-08 ASSESSMENT — FIBROSIS 4 INDEX: FIB4 SCORE: 0.81

## 2023-11-08 NOTE — PROGRESS NOTES
"Chief Complaint   Patient presents with    [facility]    Requesting Labs       HISTORY OF PRESENT ILLNESS: Patient is a 43 y.o. male, established patient who presents today to discuss medical problems as listed below:    Health Maintenance:  COMPLETED       Allergies: Patient has no known allergies.    No current outpatient medications on file.     No current facility-administered medications for this visit.       Allergies, past medical history, past surgical history, family history, social history reviewed and updated.    Review of Systems:     - Constitutional: Negative for fever, chills, unexpected weight change, and fatigue/generalized weakness.     - HEENT: Negative for headaches, vision changes, hearing changes, ear pain, ear discharge, rhinorrhea, sinus congestion, sore throat, and neck pain.      - Respiratory: Negative for cough, sputum production, chest congestion, dyspnea, wheezing, and crackles.      - Cardiovascular: Negative for chest pain, palpitations, orthopnea, and bilateral lower extremity edema.     - Gastrointestinal: Negative for heartburn, nausea, vomiting, abdominal pain, hematochezia, melena, diarrhea, constipation, and greasy/foul-smelling stools.     - Genitourinary: Negative for dysuria, polyuria, hematuria, pyuria, urinary urgency, and urinary incontinence.    - Musculoskeletal: Negative for myalgias, back pain, and joint pain.     - Skin: Negative for rash, itching, cyanotic skin color change.     - Neurological: Negative for dizziness, tingling, tremors, focal sensory deficit, focal weakness and headaches.     - Endo/Heme/Allergies: Does not bruise/bleed easily.     - Psychiatric/Behavioral: Negative for depression, suicidal/homicidal ideation and memory loss.      All other systems reviewed and are negative    Exam:    /74   Pulse 82   Temp 36.9 °C (98.4 °F) (Temporal)   Resp 16   Ht 1.854 m (6' 1\")   Wt 84.5 kg (186 lb 3.2 oz)   SpO2 99%   BMI 24.57 kg/m²  Body mass " index is 24.57 kg/m².    Physical Exam:  Constitutional: Well-developed and well-nourished. Not diaphoretic. No distress.   Skin: Skin is warm and dry. No rash noted.  Head: Atraumatic without lesions.  Eyes: Conjunctivae and extraocular motions are normal. Pupils are equal, round, and reactive to light. No scleral icterus.   Ears:  External ears unremarkable. Tympanic membranes clear and intact.  Nose: Nares patent. Septum midline. Turbinates without erythema nor edema. No discharge.   Mouth/Throat: Dentition is normal. Tongue normal. Oropharynx is clear and moist. Posterior pharynx without erythema or exudates.  Neck: Supple, trachea midline. Normal range of motion. No thyromegaly present. No lymphadenopathy--cervical or supraclavicular.  Cardiovascular: Regular rate and rhythm, S1 and S2 without murmur, rubs, or gallops.    Chest: Effort normal. Clear to auscultation throughout. No adventitious sounds. No CVA tenderness.  Abdomen: Soft, non tender, and without distention. Active bowel sounds in all four quadrants. No rebound, guarding, masses or HSM.  : Negative for dysuria, polyuria, hematuria, pyuria, urinary urgency, and urinary incontinence.  Extremities: No cyanosis, clubbing, erythema, nor edema. Distal pulses intact and symmetric.   Musculoskeletal: All major joints AROM full in all directions without pain.  Neurological: Alert and oriented x 3. DTRs 2+/3 and symmetric. No cranial nerve deficit. 5/5 myotomes. Sensation intact. Negative Rhomberg.  Psychiatric:  Behavior, mood, and affect are appropriate.  MA/nursing note and vitals reviewed.    LABS: 2022, 2023  results reviewed and discussed with the patient, questions answered.      Assessment/Plan:  Aortic root dilation (HCC)  New to me. Hx of 2017 mild dilatation of the ascending aorta  w/ a max diameter at 3.9 cm  Denies CP, SOB.     1. Aortic root dilation (HCC)  stable    2. Need for vaccination  - Hepatitis B Vaccine Adult 20+    3. Snoring  -  Referral to Pulmonary and Sleep Medicine    4. PE (physical exam), annual  - CBC WITHOUT DIFFERENTIAL; Future  - Comp Metabolic Panel; Future  - TSH; Future  - VITAMIN D,25 HYDROXY (DEFICIENCY); Future  - Lipid Profile; Future  - PSA TOTAL + %FREE; Future  - HEMOGLOBIN A1C; Future  - VITAMIN B12; Future    5. Dyslipidemia  - Lipid Profile; Future    Discussed with patient possible alternative diagnoses, patient is to take all medications as prescribed.      If symptoms persist FU w/PCP, if symptoms worsen go to emergency room.      If experiencing any side effects from prescribed medications report to the office immediately or go to emergency room.     Reviewed indication, dosage, usage and potential adverse effects of prescribed medications.      Reviewed risks and benefits of treatment plan. Patient verbalizes understanding of all instruction and verbally agrees to plan.     Discussed plan with the patient, and patient agrees to the above.      I personally reviewed prior external notes and test results pertinent to today's visit.      No follow-ups on file. Annual, PRN

## 2023-11-08 NOTE — ASSESSMENT & PLAN NOTE
New to me. Hx of 2017 mild dilatation of the ascending aorta  w/ a max diameter at 3.9 cm  Denies CP, SOB.

## 2023-11-08 NOTE — ASSESSMENT & PLAN NOTE
Latest Reference Range & Units 02/08/22 13:06 05/24/23 09:47   Cholesterol,Tot 100 - 199 mg/dL 223 (H) 239 (H)   Triglycerides 0 - 149 mg/dL 62 32   HDL >=40 mg/dL 90 105   LDL <100 mg/dL 121 (H) 128 (H)   (H): Data is abnormally high.  Chronic and stable. Healthy diet, active.   Rifampin Counseling: I discussed with the patient the risks of rifampin including but not limited to liver damage, kidney damage, red-orange body fluids, nausea/vomiting and severe allergy.

## 2023-11-13 ENCOUNTER — HOSPITAL ENCOUNTER (OUTPATIENT)
Dept: LAB | Facility: MEDICAL CENTER | Age: 43
End: 2023-11-13
Attending: NURSE PRACTITIONER
Payer: COMMERCIAL

## 2023-11-13 DIAGNOSIS — Z00.00 PE (PHYSICAL EXAM), ANNUAL: ICD-10-CM

## 2023-11-13 LAB
25(OH)D3 SERPL-MCNC: 36 NG/ML (ref 30–100)
ALBUMIN SERPL BCP-MCNC: 4.4 G/DL (ref 3.2–4.9)
ALBUMIN/GLOB SERPL: 1.7 G/DL
ALP SERPL-CCNC: 78 U/L (ref 30–99)
ALT SERPL-CCNC: 27 U/L (ref 2–50)
ANION GAP SERPL CALC-SCNC: 10 MMOL/L (ref 7–16)
AST SERPL-CCNC: 25 U/L (ref 12–45)
BILIRUB SERPL-MCNC: 0.4 MG/DL (ref 0.1–1.5)
BUN SERPL-MCNC: 12 MG/DL (ref 8–22)
CALCIUM ALBUM COR SERPL-MCNC: 9 MG/DL (ref 8.5–10.5)
CALCIUM SERPL-MCNC: 9.3 MG/DL (ref 8.5–10.5)
CHLORIDE SERPL-SCNC: 99 MMOL/L (ref 96–112)
CHOLEST SERPL-MCNC: 189 MG/DL (ref 100–199)
CO2 SERPL-SCNC: 28 MMOL/L (ref 20–33)
CREAT SERPL-MCNC: 0.74 MG/DL (ref 0.5–1.4)
ERYTHROCYTE [DISTWIDTH] IN BLOOD BY AUTOMATED COUNT: 44 FL (ref 35.9–50)
EST. AVERAGE GLUCOSE BLD GHB EST-MCNC: 111 MG/DL
GFR SERPLBLD CREATININE-BSD FMLA CKD-EPI: 113 ML/MIN/1.73 M 2
GLOBULIN SER CALC-MCNC: 2.6 G/DL (ref 1.9–3.5)
GLUCOSE SERPL-MCNC: 86 MG/DL (ref 65–99)
HBA1C MFR BLD: 5.5 % (ref 4–5.6)
HCT VFR BLD AUTO: 45.9 % (ref 42–52)
HDLC SERPL-MCNC: 95 MG/DL
HGB BLD-MCNC: 15 G/DL (ref 14–18)
LDLC SERPL CALC-MCNC: 88 MG/DL
MCH RBC QN AUTO: 30.4 PG (ref 27–33)
MCHC RBC AUTO-ENTMCNC: 32.7 G/DL (ref 32.3–36.5)
MCV RBC AUTO: 93.1 FL (ref 81.4–97.8)
PLATELET # BLD AUTO: 258 K/UL (ref 164–446)
PMV BLD AUTO: 10.5 FL (ref 9–12.9)
POTASSIUM SERPL-SCNC: 4.9 MMOL/L (ref 3.6–5.5)
PROT SERPL-MCNC: 7 G/DL (ref 6–8.2)
RBC # BLD AUTO: 4.93 M/UL (ref 4.7–6.1)
SODIUM SERPL-SCNC: 137 MMOL/L (ref 135–145)
TRIGL SERPL-MCNC: 31 MG/DL (ref 0–149)
TSH SERPL DL<=0.005 MIU/L-ACNC: 1.25 UIU/ML (ref 0.38–5.33)
VIT B12 SERPL-MCNC: 775 PG/ML (ref 211–911)
WBC # BLD AUTO: 4.5 K/UL (ref 4.8–10.8)

## 2023-11-13 PROCEDURE — 84153 ASSAY OF PSA TOTAL: CPT

## 2023-11-13 PROCEDURE — 80053 COMPREHEN METABOLIC PANEL: CPT

## 2023-11-13 PROCEDURE — 36415 COLL VENOUS BLD VENIPUNCTURE: CPT

## 2023-11-13 PROCEDURE — 84443 ASSAY THYROID STIM HORMONE: CPT

## 2023-11-13 PROCEDURE — 83036 HEMOGLOBIN GLYCOSYLATED A1C: CPT

## 2023-11-13 PROCEDURE — 82607 VITAMIN B-12: CPT

## 2023-11-13 PROCEDURE — 80061 LIPID PANEL: CPT

## 2023-11-13 PROCEDURE — 85027 COMPLETE CBC AUTOMATED: CPT

## 2023-11-13 PROCEDURE — 82306 VITAMIN D 25 HYDROXY: CPT

## 2023-11-13 PROCEDURE — 84154 ASSAY OF PSA FREE: CPT

## 2023-11-14 LAB
PSA FREE MFR SERPL: 40 %
PSA FREE SERPL-MCNC: 0.2 NG/ML
PSA SERPL-MCNC: 0.5 NG/ML (ref 0–4)

## 2023-11-16 ENCOUNTER — TELEPHONE (OUTPATIENT)
Dept: HEALTH INFORMATION MANAGEMENT | Facility: OTHER | Age: 43
End: 2023-11-16
Payer: COMMERCIAL